# Patient Record
Sex: MALE | Race: OTHER | HISPANIC OR LATINO | URBAN - METROPOLITAN AREA
[De-identification: names, ages, dates, MRNs, and addresses within clinical notes are randomized per-mention and may not be internally consistent; named-entity substitution may affect disease eponyms.]

---

## 2019-05-25 ENCOUNTER — EMERGENCY (EMERGENCY)
Facility: HOSPITAL | Age: 54
LOS: 1 days | Discharge: HOME | End: 2019-05-25
Attending: EMERGENCY MEDICINE
Payer: COMMERCIAL

## 2019-05-25 VITALS
WEIGHT: 225.09 LBS | OXYGEN SATURATION: 97 % | DIASTOLIC BLOOD PRESSURE: 92 MMHG | SYSTOLIC BLOOD PRESSURE: 158 MMHG | TEMPERATURE: 99 F | HEIGHT: 67 IN | RESPIRATION RATE: 16 BRPM | HEART RATE: 71 BPM

## 2019-05-25 PROCEDURE — 73130 X-RAY EXAM OF HAND: CPT | Mod: 26,LT,77

## 2019-05-25 PROCEDURE — 99283 EMERGENCY DEPT VISIT LOW MDM: CPT | Mod: 25

## 2019-05-25 PROCEDURE — 73130 X-RAY EXAM OF HAND: CPT

## 2019-05-25 PROCEDURE — 29125 APPL SHORT ARM SPLINT STATIC: CPT | Mod: LT

## 2019-05-25 PROCEDURE — 99284 EMERGENCY DEPT VISIT MOD MDM: CPT | Mod: 25

## 2019-05-25 PROCEDURE — 73130 X-RAY EXAM OF HAND: CPT | Mod: 26,LT,76

## 2019-05-25 PROCEDURE — 29125 APPL SHORT ARM SPLINT STATIC: CPT

## 2019-05-25 RX ORDER — IBUPROFEN 200 MG
600 TABLET ORAL ONCE
Refills: 0 | Status: COMPLETED | OUTPATIENT
Start: 2019-05-25 | End: 2019-05-25

## 2019-05-25 RX ADMIN — Medication 600 MILLIGRAM(S): at 15:18

## 2019-05-25 NOTE — ED PROVIDER NOTE - CPE EDP SKIN NORM
Encounter Date: 4/17/2017    ED Physician Progress Notes         medical record transfer from [facility] reviewed very confusing with another patient's medical records apparently overlaying some of information.  We will repeat the labs and obtain plain films  
Encounter Date: 4/17/2017    ED Physician Progress Notes        Dr. Jain consult nephrology as well as hospitalist admit to Dr. Mack  
normal...

## 2019-05-25 NOTE — ED PROVIDER NOTE - SKIN, MLM
+ superficial abrasion of the left lateral hand with no active bleeding or evidence of puncture wound.

## 2019-05-25 NOTE — ED ADULT NURSE NOTE - NSIMPLEMENTINTERV_GEN_ALL_ED
Implemented All Universal Safety Interventions:  [employer] to call system. Call bell, personal items and telephone within reach. Instruct patient to call for assistance. Room bathroom lighting operational. Non-slip footwear when patient is off stretcher. Physically safe environment: no spills, clutter or unnecessary equipment. Stretcher in lowest position, wheels locked, appropriate side rails in place.

## 2019-05-25 NOTE — ED ADULT TRIAGE NOTE - CHIEF COMPLAINT QUOTE
Pt presents to ED for eval of left hand injury s/p tree accident. Pt states that a rope pulled across his hand causing injury. +swelling and ecchymosis noted. Pt slightly limited ROM secondary to swelling.

## 2019-05-25 NOTE — ED PROVIDER NOTE - ATTENDING CONTRIBUTION TO CARE
seen with acp: well appearing, NAD; s/p left 5th digit injury with reduction and splinting; ext is neurovasc intact; +ttp and swelling over left lateral hand; otherwise no injuries, agree with acp plan of care, splint, ok for d/c with hand f/u

## 2019-05-25 NOTE — ED PROVIDER NOTE - PHYSICAL EXAMINATION
Musculoskeletal: +TTP of the left 4th/5th mcps, +TTP of the right 1st mcp with no snuffbox TTP. +flexion/extension/abduction/adduction of all left digits with decreased ROM due to pain/swelling, FROM of all digits of right hand.

## 2019-05-25 NOTE — ED ADULT NURSE NOTE - CAS DISCH BELONGINGS RETURNED
PLEASE CLICK THE EDIT BUTTON, ENTER YOUR RESPONSE TO THE QUERY AND SIGN THE NOTE.    Dr. Jones,    For accurate coding and severity-of-illness reflection, please clarify the following.    Noted management of the patient includes monitoring of daily lab values.  Noted the following:     Results for RUDDY BEDOLLA (MRN [id number]) as of 9/12/2018 10:21   Ref. Range 9/3/2018 05:00 9/4/2018 05:20 9/11/2018 04:30   PLT Latest Ref Range: 140 - 450 K/mcL 118 (L) 107 (L) 129 (L)       Please clarify if there is a secondary diagnosis associated with these findings which you are monitoring.    Thank you,   Sunny Toledo RN  Documentation Improvement  [email or web address]  Please respond here:    Mild thrombocytopenia    
Not applicable

## 2019-05-25 NOTE — ED ADULT NURSE NOTE - OBJECTIVE STATEMENT
Pt presents to ED for eval of left hand injury s/p tree accident. Pt states that a rope pulled across his hand causing injury. +swelling and ecchymosis noted. Pt slightly limited ROM secondary to swelling. Skin is open, 2x1x0.2 cm. Numbness on fifth digit.

## 2019-05-25 NOTE — ED PROVIDER NOTE - OBJECTIVE STATEMENT
53M presents to the ED c/p left 4th/5th hand pain and right 1st digit hand pain x 2 hours. Pt states that he was helping someone cut down a tree and was holding a rope that was attached to the braches. He states that the tree snapped and the rope pulled his hands. Pt denies numbness/tingling and has no other complaints.

## 2023-04-14 NOTE — ED ADULT TRIAGE NOTE - PAIN RATING/NUMBER SCALE (0-10): REST
7 Xenograft Text: The defect edges were debeveled with a #[id number] scalpel blade.  Given the location of the defect, shape of the defect and the proximity to free margins a xenograft was deemed most appropriate.  The graft was then trimmed to fit the size of the defect.  The graft was then placed in the primary defect and oriented appropriately.

## 2025-02-13 ENCOUNTER — OFFICE (OUTPATIENT)
Dept: URBAN - METROPOLITAN AREA CLINIC 115 | Facility: CLINIC | Age: 60
Setting detail: OPHTHALMOLOGY
End: 2025-02-13
Payer: MEDICAID

## 2025-02-13 DIAGNOSIS — H16.223: ICD-10-CM

## 2025-02-13 DIAGNOSIS — H52.4: ICD-10-CM

## 2025-02-13 DIAGNOSIS — H52.7: ICD-10-CM

## 2025-02-13 PROCEDURE — 99203 OFFICE O/P NEW LOW 30 MIN: CPT | Performed by: OPTOMETRIST

## 2025-02-13 PROCEDURE — 92015 DETERMINE REFRACTIVE STATE: CPT | Performed by: OPTOMETRIST

## 2025-02-13 ASSESSMENT — REFRACTION_MANIFEST
OD_ADD: +2.00
OS_SPHERE: +1.00
OS_CYLINDER: -0.25
OD_AXIS: 020
OD_CYLINDER: -0.75
OS_AXIS: 149
OS_ADD: +2.00
OD_SPHERE: +1.00
OS_VA1: 20/20
OD_VA1: 20/20

## 2025-02-13 ASSESSMENT — TONOMETRY
OS_IOP_MMHG: 14
OD_IOP_MMHG: 10

## 2025-02-13 ASSESSMENT — CONFRONTATIONAL VISUAL FIELD TEST (CVF)
OS_FINDINGS: FULL
OD_FINDINGS: FULL

## 2025-02-13 ASSESSMENT — REFRACTION_AUTOREFRACTION
OD_AXIS: 020
OS_SPHERE: +0.75
OS_CYLINDER: -0.25
OS_AXIS: 149
OD_SPHERE: +0.75
OD_CYLINDER: -0.75

## 2025-02-13 ASSESSMENT — VISUAL ACUITY
OS_BCVA: 20/25
OD_BCVA: 20/25

## 2025-02-13 ASSESSMENT — SUPERFICIAL PUNCTATE KERATITIS (SPK)
OS_SPK: 1+
OD_SPK: 1+

## 2025-02-14 ENCOUNTER — APPOINTMENT (OUTPATIENT)
Dept: DERMATOLOGY | Facility: CLINIC | Age: 60
End: 2025-02-14
Payer: MEDICAID

## 2025-02-14 PROCEDURE — 99204 OFFICE O/P NEW MOD 45 MIN: CPT
